# Patient Record
Sex: MALE | Race: WHITE | NOT HISPANIC OR LATINO | Employment: OTHER | URBAN - METROPOLITAN AREA
[De-identification: names, ages, dates, MRNs, and addresses within clinical notes are randomized per-mention and may not be internally consistent; named-entity substitution may affect disease eponyms.]

---

## 2021-03-16 ENCOUNTER — HOSPITAL ENCOUNTER (EMERGENCY)
Facility: HOSPITAL | Age: 76
Discharge: HOME | End: 2021-03-16
Attending: EMERGENCY MEDICINE | Admitting: EMERGENCY MEDICINE

## 2021-03-16 ENCOUNTER — APPOINTMENT (OUTPATIENT)
Dept: GENERAL RADIOLOGY | Facility: HOSPITAL | Age: 76
End: 2021-03-16

## 2021-03-16 VITALS
SYSTOLIC BLOOD PRESSURE: 189 MMHG | RESPIRATION RATE: 18 BRPM | WEIGHT: 228.9 LBS | HEART RATE: 75 BPM | DIASTOLIC BLOOD PRESSURE: 84 MMHG | TEMPERATURE: 98.1 F | HEIGHT: 68 IN | OXYGEN SATURATION: 99 % | BODY MASS INDEX: 34.69 KG/M2

## 2021-03-16 DIAGNOSIS — J81.0 ACUTE PULMONARY EDEMA (HCC): Primary | ICD-10-CM

## 2021-03-16 LAB
ALBUMIN SERPL-MCNC: 3.5 G/DL (ref 3.5–5.2)
ALBUMIN/GLOB SERPL: 1.3 G/DL
ALP SERPL-CCNC: 93 U/L (ref 39–117)
ALT SERPL W P-5'-P-CCNC: 30 U/L (ref 1–41)
ANION GAP SERPL CALCULATED.3IONS-SCNC: 8.2 MMOL/L (ref 5–15)
AST SERPL-CCNC: 29 U/L (ref 1–40)
BACTERIA UR QL AUTO: ABNORMAL /HPF
BASOPHILS # BLD AUTO: 0.06 10*3/MM3 (ref 0–0.2)
BASOPHILS NFR BLD AUTO: 0.8 % (ref 0–1.5)
BILIRUB SERPL-MCNC: 0.2 MG/DL (ref 0–1.2)
BILIRUB UR QL STRIP: NEGATIVE
BUN SERPL-MCNC: 17 MG/DL (ref 8–23)
BUN/CREAT SERPL: 12.7 (ref 7–25)
CALCIUM SPEC-SCNC: 9.1 MG/DL (ref 8.6–10.5)
CHLORIDE SERPL-SCNC: 109 MMOL/L (ref 98–107)
CLARITY UR: CLEAR
CO2 SERPL-SCNC: 26.8 MMOL/L (ref 22–29)
COLOR UR: YELLOW
CREAT SERPL-MCNC: 1.34 MG/DL (ref 0.76–1.27)
DEPRECATED RDW RBC AUTO: 48 FL (ref 37–54)
EOSINOPHIL # BLD AUTO: 0.38 10*3/MM3 (ref 0–0.4)
EOSINOPHIL NFR BLD AUTO: 4.9 % (ref 0.3–6.2)
ERYTHROCYTE [DISTWIDTH] IN BLOOD BY AUTOMATED COUNT: 15.6 % (ref 12.3–15.4)
GFR SERPL CREATININE-BSD FRML MDRD: 52 ML/MIN/1.73
GLOBULIN UR ELPH-MCNC: 2.8 GM/DL
GLUCOSE BLDC GLUCOMTR-MCNC: 114 MG/DL (ref 70–130)
GLUCOSE SERPL-MCNC: 41 MG/DL (ref 65–99)
GLUCOSE UR STRIP-MCNC: NEGATIVE MG/DL
HCT VFR BLD AUTO: 32.3 % (ref 37.5–51)
HGB BLD-MCNC: 10.1 G/DL (ref 13–17.7)
HGB UR QL STRIP.AUTO: NEGATIVE
HYALINE CASTS UR QL AUTO: ABNORMAL /LPF
IMM GRANULOCYTES # BLD AUTO: 0.02 10*3/MM3 (ref 0–0.05)
IMM GRANULOCYTES NFR BLD AUTO: 0.3 % (ref 0–0.5)
KETONES UR QL STRIP: NEGATIVE
LEUKOCYTE ESTERASE UR QL STRIP.AUTO: NEGATIVE
LYMPHOCYTES # BLD AUTO: 2.26 10*3/MM3 (ref 0.7–3.1)
LYMPHOCYTES NFR BLD AUTO: 29 % (ref 19.6–45.3)
MCH RBC QN AUTO: 26.3 PG (ref 26.6–33)
MCHC RBC AUTO-ENTMCNC: 31.3 G/DL (ref 31.5–35.7)
MCV RBC AUTO: 84.1 FL (ref 79–97)
MONOCYTES # BLD AUTO: 0.7 10*3/MM3 (ref 0.1–0.9)
MONOCYTES NFR BLD AUTO: 9 % (ref 5–12)
NEUTROPHILS NFR BLD AUTO: 4.37 10*3/MM3 (ref 1.7–7)
NEUTROPHILS NFR BLD AUTO: 56 % (ref 42.7–76)
NITRITE UR QL STRIP: NEGATIVE
NT-PROBNP SERPL-MCNC: 4086 PG/ML (ref 0–1800)
PH UR STRIP.AUTO: 6.5 [PH] (ref 4.5–8)
PLATELET # BLD AUTO: 313 10*3/MM3 (ref 140–450)
PMV BLD AUTO: 10.1 FL (ref 6–12)
POTASSIUM SERPL-SCNC: 3.7 MMOL/L (ref 3.5–5.2)
PROT SERPL-MCNC: 6.3 G/DL (ref 6–8.5)
PROT UR QL STRIP: ABNORMAL
RBC # BLD AUTO: 3.84 10*6/MM3 (ref 4.14–5.8)
RBC # UR: ABNORMAL /HPF
REF LAB TEST METHOD: ABNORMAL
SODIUM SERPL-SCNC: 144 MMOL/L (ref 136–145)
SP GR UR STRIP: 1.02 (ref 1–1.03)
SQUAMOUS #/AREA URNS HPF: ABNORMAL /HPF
TROPONIN T SERPL-MCNC: 0.04 NG/ML (ref 0–0.03)
UROBILINOGEN UR QL STRIP: ABNORMAL
WBC # BLD AUTO: 7.79 10*3/MM3 (ref 3.4–10.8)
WBC UR QL AUTO: ABNORMAL /HPF

## 2021-03-16 PROCEDURE — 80053 COMPREHEN METABOLIC PANEL: CPT | Performed by: EMERGENCY MEDICINE

## 2021-03-16 PROCEDURE — 99284 EMERGENCY DEPT VISIT MOD MDM: CPT | Performed by: EMERGENCY MEDICINE

## 2021-03-16 PROCEDURE — 84484 ASSAY OF TROPONIN QUANT: CPT | Performed by: EMERGENCY MEDICINE

## 2021-03-16 PROCEDURE — 85025 COMPLETE CBC W/AUTO DIFF WBC: CPT | Performed by: EMERGENCY MEDICINE

## 2021-03-16 PROCEDURE — 93010 ELECTROCARDIOGRAM REPORT: CPT | Performed by: INTERNAL MEDICINE

## 2021-03-16 PROCEDURE — 25010000002 FUROSEMIDE PER 20 MG: Performed by: EMERGENCY MEDICINE

## 2021-03-16 PROCEDURE — 82962 GLUCOSE BLOOD TEST: CPT

## 2021-03-16 PROCEDURE — 71045 X-RAY EXAM CHEST 1 VIEW: CPT

## 2021-03-16 PROCEDURE — 96374 THER/PROPH/DIAG INJ IV PUSH: CPT

## 2021-03-16 PROCEDURE — 83880 ASSAY OF NATRIURETIC PEPTIDE: CPT | Performed by: EMERGENCY MEDICINE

## 2021-03-16 PROCEDURE — 81001 URINALYSIS AUTO W/SCOPE: CPT | Performed by: EMERGENCY MEDICINE

## 2021-03-16 PROCEDURE — 93005 ELECTROCARDIOGRAM TRACING: CPT | Performed by: EMERGENCY MEDICINE

## 2021-03-16 PROCEDURE — 99284 EMERGENCY DEPT VISIT MOD MDM: CPT

## 2021-03-16 RX ORDER — BUTALBITAL, ACETAMINOPHEN AND CAFFEINE 50; 325; 40 MG/1; MG/1; MG/1
1 TABLET ORAL EVERY 4 HOURS PRN
COMMUNITY

## 2021-03-16 RX ORDER — BUDESONIDE AND FORMOTEROL FUMARATE DIHYDRATE 160; 4.5 UG/1; UG/1
2 AEROSOL RESPIRATORY (INHALATION)
COMMUNITY

## 2021-03-16 RX ORDER — DOXAZOSIN MESYLATE 4 MG/1
4 TABLET ORAL NIGHTLY
COMMUNITY

## 2021-03-16 RX ORDER — FUROSEMIDE 40 MG/1
40 TABLET ORAL DAILY
Qty: 1 TABLET | Refills: 0 | Status: SHIPPED | OUTPATIENT
Start: 2021-03-16 | End: 2021-03-16

## 2021-03-16 RX ORDER — INSULIN ASPART 100 [IU]/ML
25 INJECTION, SUSPENSION SUBCUTANEOUS 2 TIMES DAILY WITH MEALS
COMMUNITY

## 2021-03-16 RX ORDER — NIFEDIPINE 90 MG/1
90 TABLET, EXTENDED RELEASE ORAL DAILY
COMMUNITY

## 2021-03-16 RX ORDER — ATORVASTATIN CALCIUM 40 MG/1
40 TABLET, FILM COATED ORAL DAILY
COMMUNITY

## 2021-03-16 RX ORDER — ACETAMINOPHEN 500 MG
TABLET ORAL
Qty: 30 TABLET | Refills: 0 | Status: SHIPPED | OUTPATIENT
Start: 2021-03-16

## 2021-03-16 RX ORDER — ALBUTEROL SULFATE 90 UG/1
2 AEROSOL, METERED RESPIRATORY (INHALATION) EVERY 6 HOURS PRN
COMMUNITY

## 2021-03-16 RX ORDER — HYDROCODONE BITARTRATE AND ACETAMINOPHEN 5; 325 MG/1; MG/1
1 TABLET ORAL ONCE
Status: COMPLETED | OUTPATIENT
Start: 2021-03-16 | End: 2021-03-16

## 2021-03-16 RX ORDER — ACETAMINOPHEN 325 MG/1
650 TABLET ORAL ONCE
Status: DISCONTINUED | OUTPATIENT
Start: 2021-03-16 | End: 2021-03-16

## 2021-03-16 RX ORDER — ACETAMINOPHEN 325 MG/1
650 TABLET ORAL EVERY 6 HOURS PRN
COMMUNITY

## 2021-03-16 RX ORDER — HYDRALAZINE HYDROCHLORIDE 50 MG/1
50 TABLET, FILM COATED ORAL 3 TIMES DAILY
COMMUNITY

## 2021-03-16 RX ORDER — CLOPIDOGREL BISULFATE 75 MG/1
75 TABLET ORAL DAILY
COMMUNITY

## 2021-03-16 RX ORDER — FUROSEMIDE 40 MG/1
40 TABLET ORAL DAILY
Qty: 1 TABLET | Refills: 0 | Status: SHIPPED | OUTPATIENT
Start: 2021-03-16 | End: 2021-03-17

## 2021-03-16 RX ORDER — ACETAMINOPHEN 500 MG
TABLET ORAL
Qty: 30 TABLET | Refills: 0 | Status: SHIPPED | OUTPATIENT
Start: 2021-03-16 | End: 2021-03-16

## 2021-03-16 RX ORDER — MELATONIN
1000 DAILY
COMMUNITY

## 2021-03-16 RX ORDER — CARVEDILOL 3.12 MG/1
3.12 TABLET ORAL 2 TIMES DAILY WITH MEALS
COMMUNITY

## 2021-03-16 RX ORDER — FUROSEMIDE 10 MG/ML
40 INJECTION INTRAMUSCULAR; INTRAVENOUS ONCE
Status: COMPLETED | OUTPATIENT
Start: 2021-03-16 | End: 2021-03-16

## 2021-03-16 RX ADMIN — FUROSEMIDE 40 MG: 10 INJECTION, SOLUTION INTRAMUSCULAR; INTRAVENOUS at 12:01

## 2021-03-16 RX ADMIN — HYDROCODONE BITARTRATE AND ACETAMINOPHEN 1 TABLET: 5; 325 TABLET ORAL at 13:22

## 2021-03-16 NOTE — ED NOTES
Dr. Rhoades's office states that he is out today, and they will try and have someone from his group call back. Dr. baer made aware.     Mini Mast, RN  03/16/21 5623

## 2021-03-16 NOTE — ED NOTES
Medical Assistant got on the phone and said their doctors to not have privileges at [facility] and will not be called out.  Explained that the patient is in the ER, may be sent home, and has appointment with Dr Rhoades on Thursday.  The office MD is in with a patient, is currently 1.5 hours behind, so they are going to try someone at [facility].          Sherri Langford, RN  03/16/21 7291

## 2021-03-16 NOTE — ED PROVIDER NOTES
"Subjective   History of Present Illness  History of Present Illness    Chief complaint: \"Drowning in fluid\" per family member    Location: Home    Quality/Severity: Short of air    Timing/Onset/Duration: For about a month, worse today    Modifying Factors: Worse with exertion    Associated Symptoms: No headache.  No fever or chills.  The patient has a cough.  No sore throat earache or nasal congestion.  Patient is hard of hearing.  The patient has intermittent chest pain but none at this time.  No abdominal pain.  No diarrhea or burning when he urinates.  No nausea or vomiting.  He states he has been dizzy since January.    Narrative: This 75-year-old white male with a history of diabetes, chronic atrial fibrillation on Eliquis.  Chronic kidney disease, creatinine of 1.85 on March 9, 2021.  The patient has a history of spinal stenosis.  He had a diagnosis of bilateral carotid stenosis, diagnosed February 1, 2021, bilateral carotid stents March 5, 2021.  Patient was diagnosed with stroke March 2, 2020.  Patient has a history of hypertension and dyslipidemia.  Patient had a creatinine of 1.53 on admission and his peak creatinine during his last admission at [facility] was 2.33., A. fib, on chronic Eliquis and recent stroke, presents with feeling like \"he is drowning in his fluid\".  This \"was from a family member.  Patient does complain of shortness of breath.  The patient was diagnosed with Covid in January.  He was recently discharged from [facility] on March 10, 2021.  The patient states that he is compliant with his medications.  The patient had an echo March 3, 2021 that showed an ejection fraction of 69% no bubble.  Patient instructed to continue Plavix and Eliquis after carotid stent placement.  Patient instructed to continue carvedilol, hydralazine, nifedipine, and doxazosin.  He is on NovoLog 70/30 for his diabetes.  Patient is on mechanical soft diet for his dysphagia.  Patient is on a statin for his " dyslipidemia.  The patient was given an albuterol in route with improvement in his symptoms    PCP: Sabina Beckford    Cardiology: Jf    Nephrology: Alexsandra Wallace    Neurosurgery:  [place]      Review of Systems   Constitutional: Negative for chills.   HENT: Negative for congestion.    Eyes: Negative for visual disturbance.   Respiratory: Negative for chest tightness and shortness of breath.    Cardiovascular: Negative for chest pain and leg swelling.   Gastrointestinal: Negative for abdominal distention, diarrhea, nausea and vomiting.   Genitourinary: Negative for difficulty urinating.   Skin: Negative for rash.   Neurological: Positive for weakness (Generalized). Negative for numbness and headaches.   Psychiatric/Behavioral: Negative for confusion.        Medication List      ASK your doctor about these medications    acetaminophen 325 MG tablet  Commonly known as: TYLENOL     albuterol sulfate  (90 Base) MCG/ACT inhaler  Commonly known as: PROVENTIL HFA;VENTOLIN HFA;PROAIR HFA     apixaban 5 MG tablet tablet  Commonly known as: ELIQUIS     atorvastatin 40 MG tablet  Commonly known as: LIPITOR     budesonide-formoterol 160-4.5 MCG/ACT inhaler  Commonly known as: SYMBICORT     butalbital-acetaminophen-caffeine -40 MG per tablet  Commonly known as: FIORICET, ESGIC     carvedilol 3.125 MG tablet  Commonly known as: COREG     cholecalciferol 25 MCG (1000 UT) tablet  Commonly known as: VITAMIN D3     clopidogrel 75 MG tablet  Commonly known as: PLAVIX     doxazosin 4 MG tablet  Commonly known as: CARDURA     hydrALAZINE 50 MG tablet  Commonly known as: APRESOLINE     insulin aspart prot-insulin aspart (70-30) 100 UNIT/ML injection  Commonly known as: novoLOG 70/30     NIFEdipine XL 90 MG 24 hr tablet  Commonly known as: PROCARDIA XL            No past medical history on file.    No Known Allergies    Past Surgical History:   Procedure Laterality Date   • BACK SURGERY         No family history on  file.    Social History     Socioeconomic History   • Marital status:      Spouse name: Not on file   • Number of children: Not on file   • Years of education: Not on file   • Highest education level: Not on file   Tobacco Use   • Smoking status: Never Smoker   • Smokeless tobacco: Never Used   Substance and Sexual Activity   • Alcohol use: Defer   • Drug use: Defer   • Sexual activity: Defer           Objective   Physical Exam  Constitutional:       Appearance: He is well-developed.   HENT:      Head: Normocephalic and atraumatic.   Neck:      Vascular: No JVD.   Cardiovascular:      Rate and Rhythm: Normal rate. Rhythm irregular.      Heart sounds: No murmur. No gallop.    Pulmonary:      Effort: Pulmonary effort is normal.      Breath sounds: Rales (Fine rales noted in the bases) present. No wheezing or rhonchi.   Chest:      Chest wall: No tenderness.   Abdominal:      General: Bowel sounds are normal.      Palpations: Abdomen is soft.      Comments: Mild abdominal wall edema noted   Musculoskeletal:      Cervical back: Neck supple.      Right lower leg: Edema (Trace) present.      Left lower leg: Edema (Trace) present.   Skin:     General: Skin is warm and dry.      Capillary Refill: Capillary refill takes less than 2 seconds.      Coloration: Skin is not pale.      Findings: No rash.   Neurological:      General: No focal deficit present.      Mental Status: He is alert and oriented to person, place, and time.      Cranial Nerves: No cranial nerve deficit.      Motor: Weakness (Generalized weakness, difficulty raising left hand to the level of the shoulder, chronic) present.   Psychiatric:         Mood and Affect: Mood normal.         Procedures           ED Course  ED Course as of Mar 16 1259   Tue Mar 16, 2021   1052 The serum glucose is 41.  The creatinine is 1.34 and the chloride is 109.  The GFR is 52.  The troponin is 0.037, this troponin is borderline elevated and the patient has a history of  chronic kidney disease..  The proBNP is 4086.  The hemoglobin is 10.1 and the hematocrit is 32.  The patient has not given a urine thus far.    [RC]   1142 Results for LEONOR STACK (MRN [id number]) as of 3/16/2021 11:41    3/2/2021 11:50  Troponin I: 0.052 (H)      [RC]   1150 The BNP on March 2, 2021 is 2670    [RC]   1257 The urinalysis shows greater than 300 mg/dL protein, otherwise unremarkable.    [RC]   1258 Results for LEONOR STACK (MRN [id number]) as of 3/16/2021 12:58    3/9/2021 10:23  Hemoglobin: 9.8 (L)      [RC]      ED Course User Index  [RC] Jose Cruz Russo MD      10:20 EDT, 03/16/21:  The EKG was obtained at 1015 and read by me at 1017.  The EKG shows atrial fibrillation with rate of 67.  There is a normal axis with no hypertrophy.  The QRS and QT intervals are unremarkable.  There is no acute ST elevation or depression.  There is poor anterior R wave progression.  There is artifact noted in lead I, III and aVL.    15:20 EDT, 03/16/21:  I have attempted for almost 3 hours to contact Dr. Rhoades with no response from Dr. Rhoades or the physician covering call for him.  The plan will be to place the patient on Lasix 40 p.o. daily x1 and have the patient follow-up with Dr. Rhoades on Thursday.  The patient states that he can get into see Dr. Rhoades on Thursday.  The patient feels better.  His vital signs were reviewed and are stable.  He states he is having no shortness of breath.  All the patient's questions answered he will be discharged in good condition.  The patient should return the emergency department if he is worse in any way at all.  The patient wants to go home.    15:21 EDT, 03/16/21: The patient's diagnosis of pulmonary edema was discussed with him.    15:30 EDT, 03/16/21: The patient's family is requesting a prescription for Tylenol for the patient's chronic headaches.  I written the patient a prescription for Tylenol 500 mg 1-2 p.o. every 6 hours as needed dispense 30 with no refills.  This  was initially sent electronically to the [facility] pharmacy in [place] as was put in the computer initially, the family stated they would rather pick this up at the [facility] pharmacy in [place], the nurse called to cancel the prescription at [facility] and I sent another prescription to the [facility] pharmacy in [place].  The prescription for the Lasix was canceled with the [facility] in [place] and resent to [facility].                                         MDM    Final diagnoses:   Acute pulmonary edema (CMS/HCC)       ED Disposition  ED Disposition     None          No follow-up provider specified.       Medication List      No changes were made to your prescriptions during this visit.          Jose Cruz Russo MD  03/16/21 1527       Jose Cruz Russo MD  03/16/21 1522       Jose Cruz Russo MD  03/16/21 2420

## 2021-03-16 NOTE — ED NOTES
3rd call to cardiology got disconnected.  4th call placed.   states they do not page out their doctors to other hospitals.  Explained that this patient is a patient of Dr Rhoades at [facility], the patient just happens to be at [facility].  The ER doctor would like to maintain continuity of care and speak with Dr Rhoades or one of his associates.      Sherri Langford, JENNIFER  03/16/21 9711       Sherri Langford RN  03/16/21 8115

## 2021-03-16 NOTE — DISCHARGE INSTRUCTIONS
Follow-up with Dr. Rhoades on Thursday.  Return to the emergency department if there is increased shortness of breath, worse in any way at all

## 2021-03-16 NOTE — ED NOTES
No call back from Dr. Rhoades at this time, Attempted to call Dr. Rhoades again for Dr. Russo a second time.      Mini Mast, RN  03/16/21 2143

## 2021-03-16 NOTE — ED NOTES
".  Pt's daughter asking for something to \"relax\" her father.  With more questioning, nurse determined that Pt has a chronic headache and his head hurts when he coughs.  Dr Russo notified.     Sherri Langford RN  03/16/21 1257       Sherri Langford RN  03/16/21 1302    "

## 2021-03-16 NOTE — ED NOTES
Pt assisted x 1 to stand at bedside to void.  Pt did well but c/o dizziness.  Pt's daughter at bedside and updated.  Dr Russo aware daughter is here and will speak with her.  She does not want pt placed in NH.  Pt has home health and PT coming to the house.  He lives with his wife. Pt and daughter state they were not given anything at discharge to thicken liquids for pt to drink.  Dr Russo aware and will prescribe what he needs for that if he is discharged.     Sherri Langford, RN  03/16/21 3475

## 2021-03-17 LAB — QT INTERVAL: 438 MS
